# Patient Record
Sex: FEMALE
[De-identification: names, ages, dates, MRNs, and addresses within clinical notes are randomized per-mention and may not be internally consistent; named-entity substitution may affect disease eponyms.]

---

## 2018-08-29 PROBLEM — Z00.00 ENCOUNTER FOR PREVENTIVE HEALTH EXAMINATION: Status: ACTIVE | Noted: 2018-08-29

## 2018-09-18 ENCOUNTER — APPOINTMENT (OUTPATIENT)
Dept: HEART AND VASCULAR | Facility: CLINIC | Age: 58
End: 2018-09-18
Payer: COMMERCIAL

## 2018-09-18 VITALS — SYSTOLIC BLOOD PRESSURE: 140 MMHG | DIASTOLIC BLOOD PRESSURE: 86 MMHG

## 2018-09-18 VITALS
SYSTOLIC BLOOD PRESSURE: 142 MMHG | WEIGHT: 150 LBS | HEIGHT: 61 IN | BODY MASS INDEX: 28.32 KG/M2 | HEART RATE: 77 BPM | DIASTOLIC BLOOD PRESSURE: 90 MMHG

## 2018-09-18 DIAGNOSIS — Z82.3 FAMILY HISTORY OF STROKE: ICD-10-CM

## 2018-09-18 DIAGNOSIS — Z82.49 FAMILY HISTORY OF ISCHEMIC HEART DISEASE AND OTHER DISEASES OF THE CIRCULATORY SYSTEM: ICD-10-CM

## 2018-09-18 PROCEDURE — 93306 TTE W/DOPPLER COMPLETE: CPT

## 2018-09-18 PROCEDURE — 93000 ELECTROCARDIOGRAM COMPLETE: CPT

## 2018-09-18 PROCEDURE — 99215 OFFICE O/P EST HI 40 MIN: CPT | Mod: 25

## 2018-09-24 ENCOUNTER — RX CHANGE (OUTPATIENT)
Age: 58
End: 2018-09-24

## 2018-10-02 ENCOUNTER — RX RENEWAL (OUTPATIENT)
Age: 58
End: 2018-10-02

## 2018-10-02 RX ORDER — ROSUVASTATIN CALCIUM 40 MG/1
40 TABLET, FILM COATED ORAL
Qty: 60 | Refills: 3 | Status: ACTIVE | COMMUNITY
Start: 1900-01-01 | End: 1900-01-01

## 2019-04-01 ENCOUNTER — APPOINTMENT (OUTPATIENT)
Dept: HEART AND VASCULAR | Facility: CLINIC | Age: 59
End: 2019-04-01
Payer: COMMERCIAL

## 2019-04-01 ENCOUNTER — NON-APPOINTMENT (OUTPATIENT)
Age: 59
End: 2019-04-01

## 2019-04-01 VITALS
WEIGHT: 152 LBS | DIASTOLIC BLOOD PRESSURE: 84 MMHG | HEART RATE: 80 BPM | SYSTOLIC BLOOD PRESSURE: 132 MMHG | HEIGHT: 61 IN | BODY MASS INDEX: 28.7 KG/M2

## 2019-04-01 PROCEDURE — 93000 ELECTROCARDIOGRAM COMPLETE: CPT

## 2019-04-01 PROCEDURE — 36415 COLL VENOUS BLD VENIPUNCTURE: CPT

## 2019-04-01 PROCEDURE — 99214 OFFICE O/P EST MOD 30 MIN: CPT

## 2019-04-01 RX ORDER — ASPIRIN 81 MG/1
81 TABLET, CHEWABLE ORAL
Refills: 0 | Status: DISCONTINUED | COMMUNITY
End: 2019-04-01

## 2019-04-01 NOTE — HISTORY OF PRESENT ILLNESS
[FreeTextEntry1] : 58 year old woman with a history of DM, HTN, HLD and strong family history of premature CAD who returns for follow-up.She is excited about her upcoming travels to [place]. \par \par -She is exercising regularly and eating well. \par -She eats quite well and is using [employer]. \par -Patient is known to me from my practice at [facility] and is here to establish care at [facility]. \par \par -FH- mother- CVA 58, father MI 55, sister MI 59, paternal uncle & paternal aunt  HD, one bypass paternal aunt CVA, siblings with HLD, HTN\par \par 7/31/2017- chol- 185, HDL- 45, LDL- 115, trig- 123 , lpa 424\par  in 2015\par 11/28/2018- Treadmill stress test- no EKG evidence of ischemia during max stress test, average exercise capacity for age and gender, no chest pain with max exertion  (8 minutes 5 sec)\par 2/2/2018- calcium score- 4, LAD 3, RCA 1, 4 mm lung nodule (never smoker) \par \par total chol 159, HDL- 44, LDL-99, trig 71

## 2019-04-01 NOTE — ASSESSMENT
[FreeTextEntry1] : 58 year old woman with a history of DM, HTN, HLD and strong family history of premature CAD who returns for follow-up.She is excited about her upcoming travels to [place].

## 2019-04-02 LAB
25(OH)D3 SERPL-MCNC: 13.6 NG/ML
ALBUMIN SERPL ELPH-MCNC: 4.8 G/DL
ALP BLD-CCNC: 96 U/L
ALT SERPL-CCNC: 24 U/L
ANION GAP SERPL CALC-SCNC: 19 MMOL/L
AST SERPL-CCNC: 26 U/L
BASOPHILS # BLD AUTO: 0.02 K/UL
BASOPHILS NFR BLD AUTO: 0.5 %
BILIRUB SERPL-MCNC: 0.7 MG/DL
BUN SERPL-MCNC: 17 MG/DL
CALCIUM SERPL-MCNC: 10.4 MG/DL
CHLORIDE SERPL-SCNC: 102 MMOL/L
CHOLEST SERPL-MCNC: 162 MG/DL
CHOLEST/HDLC SERPL: 3.5 RATIO
CO2 SERPL-SCNC: 23 MMOL/L
CREAT SERPL-MCNC: 0.71 MG/DL
EOSINOPHIL # BLD AUTO: 0.03 K/UL
EOSINOPHIL NFR BLD AUTO: 0.7 %
ESTIMATED AVERAGE GLUCOSE: 146 MG/DL
GLUCOSE SERPL-MCNC: 91 MG/DL
HBA1C MFR BLD HPLC: 6.7 %
HCT VFR BLD CALC: 40.1 %
HDLC SERPL-MCNC: 46 MG/DL
HGB BLD-MCNC: 12.5 G/DL
IMM GRANULOCYTES NFR BLD AUTO: 0.2 %
LDLC SERPL CALC-MCNC: 105 MG/DL
LYMPHOCYTES # BLD AUTO: 1.86 K/UL
LYMPHOCYTES NFR BLD AUTO: 42.9 %
MAN DIFF?: NORMAL
MCHC RBC-ENTMCNC: 25.8 PG
MCHC RBC-ENTMCNC: 31.2 GM/DL
MCV RBC AUTO: 82.7 FL
MONOCYTES # BLD AUTO: 0.32 K/UL
MONOCYTES NFR BLD AUTO: 7.4 %
NEUTROPHILS # BLD AUTO: 2.1 K/UL
NEUTROPHILS NFR BLD AUTO: 48.3 %
PLATELET # BLD AUTO: 324 K/UL
POTASSIUM SERPL-SCNC: 4.4 MMOL/L
PROT SERPL-MCNC: 7.4 G/DL
RBC # BLD: 4.85 M/UL
RBC # FLD: 14.8 %
SODIUM SERPL-SCNC: 144 MMOL/L
TRIGL SERPL-MCNC: 54 MG/DL
VIT B12 SERPL-MCNC: 757 PG/ML
WBC # FLD AUTO: 4.34 K/UL

## 2019-04-05 ENCOUNTER — RX RENEWAL (OUTPATIENT)
Age: 59
End: 2019-04-05

## 2019-04-05 LAB — APO LP(A) SERPL-MCNC: 675 NMOL/L

## 2019-04-05 RX ORDER — EZETIMIBE 10 MG/1
10 TABLET ORAL
Qty: 90 | Refills: 3 | Status: ACTIVE | COMMUNITY
Start: 2019-04-05 | End: 1900-01-01

## 2019-10-14 ENCOUNTER — APPOINTMENT (OUTPATIENT)
Dept: HEART AND VASCULAR | Facility: CLINIC | Age: 59
End: 2019-10-14
Payer: COMMERCIAL

## 2019-10-14 ENCOUNTER — NON-APPOINTMENT (OUTPATIENT)
Age: 59
End: 2019-10-14

## 2019-10-14 VITALS
BODY MASS INDEX: 27.97 KG/M2 | OXYGEN SATURATION: 96 % | WEIGHT: 152 LBS | SYSTOLIC BLOOD PRESSURE: 134 MMHG | HEIGHT: 62 IN | HEART RATE: 84 BPM | DIASTOLIC BLOOD PRESSURE: 90 MMHG

## 2019-10-14 PROCEDURE — 93000 ELECTROCARDIOGRAM COMPLETE: CPT

## 2019-10-14 PROCEDURE — 99215 OFFICE O/P EST HI 40 MIN: CPT

## 2019-10-14 RX ORDER — LOSARTAN POTASSIUM AND HYDROCHLOROTHIAZIDE 50; 12.5 MG/1; MG/1
50-12.5 TABLET, FILM COATED ORAL
Refills: 0 | Status: DISCONTINUED | COMMUNITY
End: 2019-10-14

## 2019-10-14 NOTE — PHYSICAL EXAM
[General Appearance - Well Developed] : well developed [Well Groomed] : well groomed [Normal Appearance] : normal appearance [General Appearance - Well Nourished] : well nourished [No Deformities] : no deformities [General Appearance - In No Acute Distress] : no acute distress [Normal Jugular Venous A Waves Present] : normal jugular venous A waves present [Normal Jugular Venous V Waves Present] : normal jugular venous V waves present [No Jugular Venous Estrada A Waves] : no jugular venous estrada A waves [Respiration, Rhythm And Depth] : normal respiratory rhythm and effort [Exaggerated Use Of Accessory Muscles For Inspiration] : no accessory muscle use [Auscultation Breath Sounds / Voice Sounds] : lungs were clear to auscultation bilaterally [Heart Rate And Rhythm] : heart rate and rhythm were normal [Heart Sounds] : normal S1 and S2 [Murmurs] : no murmurs present [Abdomen Soft] : soft [Abdomen Tenderness] : non-tender [Abdomen Mass (___ Cm)] : no abdominal mass palpated [Skin Color & Pigmentation] : normal skin color and pigmentation [] : no rash [No Venous Stasis] : no venous stasis [Skin Lesions] : no skin lesions [No Skin Ulcers] : no skin ulcer [No Xanthoma] : no  xanthoma was observed [Oriented To Time, Place, And Person] : oriented to person, place, and time [Affect] : the affect was normal [Mood] : the mood was normal [No Anxiety] : not feeling anxious

## 2019-10-14 NOTE — REVIEW OF SYSTEMS
[Joint Pain] : joint pain [Joint Swelling] : joint swelling [Joint Stiffness] : joint stiffness [Negative] : Neurological

## 2019-10-14 NOTE — DISCUSSION/SUMMARY
[FreeTextEntry1] : Ms. Sears remains far from LDL goal in the setting of having an elevated Lpa level and mild coronary plaque. She is interested in a PCSK-9 inhibitor to lower her risk if she was to be able to obtain. \par \par She is also looking for mechanisms to get more exercise despite her foot problems. \par Encouraged her to continue her healthy eating.  \par I have  her Hyzaar for her to get losartan and chlorthalidone for improved lowering. She will check BP's at home and follow-up with her internist in December. \par Return in 6 months unless we can offer her PCSK-9 inh prior.

## 2019-10-14 NOTE — HISTORY OF PRESENT ILLNESS
[FreeTextEntry1] : 58 year old woman (former [facility] patient) with a history of DM, HTN, HLD and strong family history of premature CAD who returns for follow-up. \par \par -She has been going to wellness class and is using herbs and spices without salt. She has been eating healthy. \par -She has not been as active lately. Her ankles are inflamed from plantar fascitis. \par -She has not had edema in her legs. \par \par -FH- mother- CVA 58, father MI 55, sister MI 59, paternal uncle & paternal aunt  HD, one bypass paternal aunt CVA, siblings with HLD, HTN\par \par 7/31/2017- chol- 185, HDL- 45, LDL- 115, trig- 123 , lpa 424\par  in 2015\par 11/28/2018- Treadmill stress test- no EKG evidence of ischemia during max stress test, average exercise capacity for age and gender, no chest pain with max exertion  (8 minutes 5 sec)\par 2/2/2018- calcium score- 4, LAD 3, RCA 1, 4 mm lung nodule (never smoker) \par 9/18/2018- echo- nl LV & RV size and function, normal valves, no pericardial effusion \par total chol 159, HDL- 44, LDL-99, trig 71\par \par 8/6/2019- 6.6, total- 153, trig- 87, HDL- 39, LDL- 97\par

## 2019-10-15 ENCOUNTER — RX RENEWAL (OUTPATIENT)
Age: 59
End: 2019-10-15

## 2020-06-08 ENCOUNTER — APPOINTMENT (OUTPATIENT)
Dept: HEART AND VASCULAR | Facility: CLINIC | Age: 60
End: 2020-06-08

## 2020-06-25 ENCOUNTER — APPOINTMENT (OUTPATIENT)
Dept: HEART AND VASCULAR | Facility: CLINIC | Age: 60
End: 2020-06-25
Payer: COMMERCIAL

## 2020-06-25 DIAGNOSIS — R39.81 FUNCTIONAL URINARY INCONTINENCE: ICD-10-CM

## 2020-06-25 DIAGNOSIS — E87.6 HYPOKALEMIA: ICD-10-CM

## 2020-06-25 PROCEDURE — 99214 OFFICE O/P EST MOD 30 MIN: CPT | Mod: 95

## 2020-06-25 RX ORDER — OXYBUTYNIN CHLORIDE 10 MG/1
10 TABLET, EXTENDED RELEASE ORAL ONCE
Qty: 90 | Refills: 3 | Status: ACTIVE | COMMUNITY
Start: 2020-06-25

## 2020-06-25 NOTE — DISCUSSION/SUMMARY
[FreeTextEntry1] : 60 year old woman (former [facility] patient) with a history of DM, HTN, HLD and strong family history of premature CAD who returns for follow-up. \par \par EKG & echo fall 2020. \par She is planning to stay local this summer and not travel.\par She is working to further optimize her risk factors with increased walking and healthy meals and seems motivated to continue.  \par Continnue current regimen.

## 2020-06-25 NOTE — HISTORY OF PRESENT ILLNESS
[Home] : at home, [unfilled] , at the time of the visit. [Verbal consent obtained from patient] : the patient, [unfilled] [Medical Office: ([facility])___] : at the medical office located in  [FreeTextEntry1] : This visit was conducted using a telehealth platform in the setting of COVID-19 Pandemic.\par Patient initiated current encounter. Patient has provided verbal authorization to participate in this visit. \par Reason for telehealth visit: \par I have spent 25  minutes with the patient face-to-face discussing.\par Documentation- 5 minutes\par Physical exam was not done, however patient provided blood pressures. \par \par 60 year old woman (former [facility] patient) with a history of DM, HTN, HLD and strong family history of premature CAD who returns for follow-up. \par \par She has not had any symptoms related to COVID- no fever, chills, shortness of breath. \par She walks three to four times weekly and no symptoms with that. \par She has a posterior tibial issue and has been doing PT at home and does a classical stretch class at home. \par -She has not had any significant swelling in her legs. She has only in the left ankle swelling focal because of injury. \par \par She has noticed that her taste has change since the past two months. \par Her blood pressures were around 130's/80's and now most of blood pressures are in the 120's. She checks it about 3-4 times weekly at random. \par \par She is only taking her metformin about 4 times weekly. \par \par -FH- mother- CVA 58, father MI 55, sister MI 59, paternal uncle & paternal aunt  HD, one bypass paternal aunt CVA, siblings with HLD, HTN\par \par 7/31/2017- chol- 185, HDL- 45, LDL- 115, trig- 123 , lpa 424\par  in 2015\par 11/28/2018- Treadmill stress test- no EKG evidence of ischemia during max stress test, average exercise capacity for age and gender, no chest pain with max exertion  (8 minutes 5 sec)\par 2/2/2018- calcium score- 4, LAD 3, RCA 1, 4 mm lung nodule (never smoker) \par 9/18/2018- echo- nl LV & RV size and function, normal valves, no pericardial effusion \par total chol 159, HDL- 44, LDL-99, trig 71\par \par 8/6/2019- 6.6, total- 153, trig- 87, HDL- 39, LDL- 97\par \par 5/2020- HbA1C- 6.7\par

## 2020-10-09 ENCOUNTER — APPOINTMENT (OUTPATIENT)
Dept: HEART AND VASCULAR | Facility: CLINIC | Age: 60
End: 2020-10-09

## 2020-11-02 ENCOUNTER — APPOINTMENT (OUTPATIENT)
Dept: HEART AND VASCULAR | Facility: CLINIC | Age: 60
End: 2020-11-02
Payer: COMMERCIAL

## 2020-11-02 VITALS
DIASTOLIC BLOOD PRESSURE: 79 MMHG | BODY MASS INDEX: 28.16 KG/M2 | SYSTOLIC BLOOD PRESSURE: 139 MMHG | WEIGHT: 153 LBS | HEIGHT: 62 IN

## 2020-11-02 DIAGNOSIS — I34.0 NONRHEUMATIC MITRAL (VALVE) INSUFFICIENCY: ICD-10-CM

## 2020-11-02 DIAGNOSIS — I51.7 CARDIOMEGALY: ICD-10-CM

## 2020-11-02 PROCEDURE — 93306 TTE W/DOPPLER COMPLETE: CPT

## 2020-11-02 PROCEDURE — 99072 ADDL SUPL MATRL&STAF TM PHE: CPT

## 2020-11-04 PROBLEM — I34.0 MILD MITRAL REGURGITATION: Status: ACTIVE | Noted: 2020-11-04

## 2020-11-04 PROBLEM — I51.7 MILD CONCENTRIC LEFT VENTRICULAR HYPERTROPHY (LVH): Status: ACTIVE | Noted: 2020-11-04

## 2022-07-08 ENCOUNTER — APPOINTMENT (OUTPATIENT)
Dept: HEART AND VASCULAR | Facility: CLINIC | Age: 62
End: 2022-07-08

## 2022-07-08 ENCOUNTER — NON-APPOINTMENT (OUTPATIENT)
Age: 62
End: 2022-07-08

## 2022-07-08 VITALS
DIASTOLIC BLOOD PRESSURE: 84 MMHG | HEIGHT: 62.5 IN | HEART RATE: 76 BPM | WEIGHT: 155 LBS | BODY MASS INDEX: 27.81 KG/M2 | SYSTOLIC BLOOD PRESSURE: 136 MMHG | TEMPERATURE: 97.6 F | OXYGEN SATURATION: 98 %

## 2022-07-08 VITALS — DIASTOLIC BLOOD PRESSURE: 80 MMHG | SYSTOLIC BLOOD PRESSURE: 122 MMHG

## 2022-07-08 PROCEDURE — 99215 OFFICE O/P EST HI 40 MIN: CPT

## 2022-07-08 PROCEDURE — 93000 ELECTROCARDIOGRAM COMPLETE: CPT

## 2022-07-08 RX ORDER — METFORMIN ER 500 MG 500 MG/1
500 TABLET ORAL
Qty: 90 | Refills: 0 | Status: ACTIVE | COMMUNITY
Start: 2022-03-21

## 2022-07-08 RX ORDER — AMLODIPINE BESYLATE 5 MG/1
5 TABLET ORAL
Refills: 0 | Status: COMPLETED | COMMUNITY
End: 2022-07-08

## 2022-07-08 RX ORDER — LOSARTAN POTASSIUM 100 MG/1
100 TABLET, FILM COATED ORAL
Qty: 90 | Refills: 0 | Status: ACTIVE | COMMUNITY
Start: 2022-06-01

## 2022-07-08 RX ORDER — ASPIRIN 81 MG/1
81 TABLET, COATED ORAL
Qty: 90 | Refills: 0 | Status: ACTIVE | COMMUNITY
Start: 2022-03-23

## 2022-07-08 RX ORDER — ASPIRIN 81 MG
81 TABLET,CHEWABLE ORAL
Refills: 0 | Status: COMPLETED | COMMUNITY
Start: 2019-04-01 | End: 2022-07-08

## 2022-07-08 RX ORDER — LOSARTAN POTASSIUM 50 MG/1
50 TABLET, FILM COATED ORAL DAILY
Qty: 90 | Refills: 3 | Status: COMPLETED | COMMUNITY
Start: 2019-10-14 | End: 2022-07-08

## 2022-07-08 RX ORDER — CHLORTHALIDONE 25 MG/1
25 TABLET ORAL DAILY
Qty: 90 | Refills: 3 | Status: COMPLETED | COMMUNITY
Start: 2019-10-14 | End: 2022-07-08

## 2022-07-08 RX ORDER — METFORMIN HYDROCHLORIDE 500 MG/1
500 TABLET, COATED ORAL
Refills: 0 | Status: COMPLETED | COMMUNITY
End: 2022-07-08

## 2022-07-08 RX ORDER — AMLODIPINE BESYLATE 10 MG/1
10 TABLET ORAL
Qty: 90 | Refills: 0 | Status: ACTIVE | COMMUNITY
Start: 2022-03-21

## 2022-07-08 NOTE — REASON FOR VISIT
[Hyperlipidemia] : hyperlipidemia [Hypertension] : hypertension [FreeTextEntry1] : Pt. is a 62 year old woman with a history of DM (last A1c 6.6% - 03/2022), HTN, HLD and strong family history of premature CAD who presents for follow-up & repeat TTE today.  warm

## 2022-07-08 NOTE — REVIEW OF SYSTEMS
[Chest Discomfort] : chest discomfort [Fever] : no fever [Chills] : no chills [SOB] : no shortness of breath [Dyspnea on exertion] : not dyspnea during exertion [Lower Ext Edema] : no extremity edema [Leg Claudication] : no intermittent leg claudication [Palpitations] : no palpitations [PND] : no PND [Syncope] : no syncope [Cough] : no cough [Abdominal Pain] : no abdominal pain [Nausea] : no nausea [Rash] : no rash [Dizziness] : no dizziness

## 2022-07-08 NOTE — HISTORY OF PRESENT ILLNESS
[FreeTextEntry1] : Since her last visit, pt. reports following up with [facility] Endocrinologist (MD Dr. Joseph Alves) for bilateral thyroid nodules. Per pt., nodules are benign and no tx is indicated at this time. Per Endocrine, pt. stopped Chlorthalidone 6 weeks ago due to hypercalcemia (10.7 on 2022). Per pt.'s PCP (Dr. Bandar Gandara), her Losartan was increased from 50 mg QD to 100 mg QD for improved BP control. Pt. monitors BP at home w/ readings normally around 120/80.\par \par Activity: Pt. admits to a sedentary lifestyle. She only walks occasionally. When walking 1 block with normal incline, she reports a tightness in her chest.  Pt. only feels this chest tightness on exertion, lasting for seconds at a time. Occurs every time she walks more than 1 block. After 2 or 3 more blocks, the feeling subsides. Not associated w/ SOB. Otherwise, pt. denies dyspnea, orthopnea, PND, palpitations, syncope or pedal edema. \par \par Pt. reports urinary frequency at night since stopping the Chlorthalidone - pt. hasn't seen Urologist in over a year. \par \par FH: mother- CVA 58, father MI 55, sister MI 59, paternal uncle & paternal aunt  HD, one bypass paternal aunt CVA, siblings with HLD, HTN\par \par 2018- Treadmill stress test- no EKG evidence of ischemia during max stress test, average exercise capacity for age and gender, no chest pain with max exertion  (8 minutes 5 sec)\par \par 2018- calcium score- 4, LAD 3, RCA 1, 4 mm lung nodule (never smoker) \par \par 2020 - ECHO - mild mitral regurgitation, mildly dilated left atrium, nml LVSF EF 65%, minimal LVH, mild-moderate pulmonic regurgitation, nml pulm pressures \par \par Lipid panel (2022): TC: 150, T, HDL: 43, LDL: 94\par A1C: 6.6%

## 2022-07-08 NOTE — ASSESSMENT
[FreeTextEntry1] : Pt. is a 62 year old woman with a history of DM (last A1c 6.6% - 03/2022), HTN, HLD and strong family history of premature CAD who presents for follow-up & repeat TTE today. \par \par HTN:\par - Controlled on current regimen \par - Continue Amlodipine 10 mg QD & Losartan Potassium 100 mg QD\par - Encouraged the patient to monitor blood pressure at home, keep a log, and report results back to us for evaluation. Based on results, we will adjust the regimen as necessary. \par Also advised patient to be mindful of sodium and alcohol intake, as well as any over-the-counter medications (such as NSAIDs or decongestants) that may increase blood pressure. \par \par HLD:\par - Last LDL (03/2022) 94 at goal LDL < 100\par - Continue Rosuvastatin 40 mg QD & Zetia 10 mg QD for continued lipid management \par - Last CAC score (02/2018): 4 - low atherosclerotic risk burden \par \par Chest pain on exertion: \par - EKG NSR @ 76 bpm\par - Will obtain stress EKG to rule-out ischemic etiology of chest pain \par - Last stress EKG (11//2018) revealed no EKG evidence of ischemia during max stress test, average exercise capacity for age and gender, no chest pain with max exertion  (8 minutes 5 sec)\par \par Left carotid bruit:\par - Will obtain bilateral carotid US at next visit \par - LUSB systolic ejection murmur appreciated on PE - could be radiation of murmur \par \par DM:\par - Last A1c (03/2022) 6.6%\par - Continue glycemic control with Metformin 500 mg QD\par - Pt. followed by Endocrine \par \par Pt. recommended to follow-up w/ Urology for urinary frequency \par \par Pt. to RTC for stress EKG & bilateral carotid US.

## 2022-07-08 NOTE — PHYSICAL EXAM
[Normal Conjunctiva] : normal conjunctiva [Normal S1, S2] : normal S1, S2 [Soft] : abdomen soft [Non Tender] : non-tender [Normal Gait] : normal gait [No Edema] : no edema [No Cyanosis] : no cyanosis [No Clubbing] : no clubbing [No Varicosities] : no varicosities [No Rash] : no rash [Normal] : alert and oriented, normal memory [Normal Venous Pressure] : normal venous pressure [Carotid Bruit] : carotid bruit [de-identified] : left [de-identified] : l [de-identified] : systolic murmur heard best at LUSB

## 2022-09-23 ENCOUNTER — APPOINTMENT (OUTPATIENT)
Dept: HEART AND VASCULAR | Facility: CLINIC | Age: 62
End: 2022-09-23

## 2022-09-23 VITALS
HEIGHT: 62 IN | HEART RATE: 77 BPM | SYSTOLIC BLOOD PRESSURE: 127 MMHG | DIASTOLIC BLOOD PRESSURE: 84 MMHG | TEMPERATURE: 97.5 F | WEIGHT: 158 LBS | BODY MASS INDEX: 29.08 KG/M2 | OXYGEN SATURATION: 98 %

## 2022-09-23 DIAGNOSIS — I10 ESSENTIAL (PRIMARY) HYPERTENSION: ICD-10-CM

## 2022-09-23 DIAGNOSIS — R01.1 CARDIAC MURMUR, UNSPECIFIED: ICD-10-CM

## 2022-09-23 DIAGNOSIS — R09.89 OTHER SPECIFIED SYMPTOMS AND SIGNS INVOLVING THE CIRCULATORY AND RESPIRATORY SYSTEMS: ICD-10-CM

## 2022-09-23 DIAGNOSIS — E78.5 HYPERLIPIDEMIA, UNSPECIFIED: ICD-10-CM

## 2022-09-23 DIAGNOSIS — R07.9 CHEST PAIN, UNSPECIFIED: ICD-10-CM

## 2022-09-23 DIAGNOSIS — E11.9 TYPE 2 DIABETES MELLITUS W/OUT COMPLICATIONS: ICD-10-CM

## 2022-09-23 PROCEDURE — 93015 CV STRESS TEST SUPVJ I&R: CPT

## 2022-09-23 PROCEDURE — 93880 EXTRACRANIAL BILAT STUDY: CPT

## 2022-09-23 PROCEDURE — 99215 OFFICE O/P EST HI 40 MIN: CPT

## 2022-09-23 PROCEDURE — 99215 OFFICE O/P EST HI 40 MIN: CPT | Mod: 25

## 2022-09-23 RX ORDER — POTASSIUM CHLORIDE 1500 MG/1
20 TABLET, FILM COATED, EXTENDED RELEASE ORAL ONCE
Qty: 90 | Refills: 3 | Status: COMPLETED | COMMUNITY
Start: 2020-06-25 | End: 2022-09-23

## 2022-09-24 NOTE — CARDIOLOGY SUMMARY
[de-identified] : 09/23/2022: NSR @ 76 bpm\par 07/08/2022: NSR @ 76 bpm [de-identified] : 11/28/2018- Treadmill stress test- no EKG evidence of ischemia during max stress test, average exercise capacity for age and gender, no chest pain with max exertion (8 minutes 5 sec) [de-identified] : 11/2020 - ECHO - mild mitral regurgitation, mildly dilated left atrium, nml LVSF EF 65%, minimal LVH, mild-moderate pulmonic regurgitation, nml pulm pressures  [de-identified] : 2/2/2018- calcium score- 4, LAD 3, RCA 1, 4 mm lung nodule (never smoker)

## 2022-09-24 NOTE — REASON FOR VISIT
[Hyperlipidemia] : hyperlipidemia [Hypertension] : hypertension [FreeTextEntry1] : Pt. is a 62 year old woman with a history of DM (last A1c 6.6% - 03/2022), HTN, HLD and strong family history of premature CAD who presents for follow-up, carotid doppler and stress EKG today.

## 2022-09-24 NOTE — REVIEW OF SYSTEMS
[Chest Discomfort] : chest discomfort [Fever] : no fever [Chills] : no chills [SOB] : no shortness of breath [Dyspnea on exertion] : not dyspnea during exertion [Lower Ext Edema] : no extremity edema [Leg Claudication] : no intermittent leg claudication [Palpitations] : no palpitations [Orthopnea] : no orthopnea [PND] : no PND [Syncope] : no syncope [Cough] : no cough [Dizziness] : no dizziness

## 2022-09-24 NOTE — HISTORY OF PRESENT ILLNESS
[FreeTextEntry1] : Since her last visit, patient reports experiencing constipation, abdominal bloating and flank pain for which she followed-up with Urologist Dr. Mathews two weeks ago. She underwent a CT abdomen/pelvis as well as a renal US both of which were negative for acute pathology. She was started on abx for a suspected UTI. She continues to experience central chest tightness after walking 1-2 blocks with normal incline, lasting seconds at a time. The feeling subsides after walking 3+ blocks. Per pt., this has been occurring for a "lifetime." She denies associated dyspnea, orthopnea, PND, palpitations, pedal edema, claudication or syncope.\par \par Of note: \par - She will be seeing a new Urologist for urinary frequency ( Dr. Moura)\par ------------------------------------------------------\par Lab work:\par 2022: \par Lipid panel: TC: 149, T, HDL: 41, LDL: 96\par A1c: 6.5%\par 2022\par Lipid panel: TC: 150, T, HDL: 43, LDL: 94\par A1C: 6.6% \par \par Radiology:\par Recent CT abdomen (2022): No acute abdominopelvic pathology. No hydronephrosis or renal calculi\par US kidneys (2022): No hydronephrosis or renal stone. simple right renal cyst \par

## 2022-09-24 NOTE — ASSESSMENT
[FreeTextEntry1] : Pt. is a 62 year old woman with a history of DM (last A1c 6.6% - 03/2022), HTN, HLD and strong family history of premature CAD who presents for follow-up, carotid doppler and stress EKG today.\par \par New diastolic murmur: \par - Will repeat echocardiogram within the next month to assess for worsening valve disease- suspect that it is more likely pulmonic insufficiency based on last echo, but want to assure not AI. \par - Last echocardiogram (2020) revealed mild mitral regurgitation, mildly dilated left atrium, nml LVSF EF 65%, minimal LVH, mild-moderate pulmonic regurgitation and nml pulm pressures \par \par Chest pain on exertion: \par - EKG today: NSR @ 76 bpm\par - Stress EKG today revealed no EKG evidence of ischemia during max stress test, good exercise capacity for age and gender, frequent PVCs and some chest pain with max exertion (6 minutes 43 seconds) \par - Last stress EKG (11//2018) normal\par - Consider CCTA for further ischemic evaluation \par - Consider chest Xray to r/o pulmonary pathology of chest pain\par - Pt. encouraged to increase exercise as tolerated for improved functional capacity \par \par HTN:\par - Controlled on current regimen \par - Continue Amlodipine 10 mg QD & Losartan Potassium 100 mg QD\par \par HLD:\par - Last CAC score (02/2018): 4 - low atherosclerotic risk burden - consider repeating\par - Last LDL (08/2022) 96 elevated above goal LDL < 70 (given hx of DM) \par - Discussed PCSK9-I with patient for additional LDL reduction - will consider after CCTA \par - Continue Rosuvastatin 40 mg QD & Zetia 10 mg QD for continued lipid management \par \par Left carotid bruit:\par -More likely radiation from heart\par - Bilateral carotid US today \par \par DM:\par - Last A1c (08/2022) 6.5% - controlled\par - Continue glycemic control with Metformin 500 mg QD\par - Pt. followed by Endocrine \par \par Pt. to RTC for echocardiogram.\par

## 2022-09-24 NOTE — PHYSICAL EXAM
[Normal Conjunctiva] : normal conjunctiva [Normal Venous Pressure] : normal venous pressure [Normal S1, S2] : normal S1, S2 [Normal PT B/L] : normal PT B/L [Normal] : alert and oriented, normal memory [de-identified] : Left carotid bruit vs radiation of murmur  [de-identified] : 3/6 diastolic murmur heard best at LUSB [de-identified] : Trace edema bilaterally

## 2022-09-27 PROBLEM — R09.89 CAROTID BRUIT: Status: ACTIVE | Noted: 2022-07-08

## 2022-12-04 ENCOUNTER — NON-APPOINTMENT (OUTPATIENT)
Age: 62
End: 2022-12-04

## 2022-12-08 ENCOUNTER — TRANSCRIPTION ENCOUNTER (OUTPATIENT)
Age: 62
End: 2022-12-08

## 2022-12-08 ENCOUNTER — APPOINTMENT (OUTPATIENT)
Dept: HEART AND VASCULAR | Facility: CLINIC | Age: 62
End: 2022-12-08

## 2022-12-08 VITALS
TEMPERATURE: 97.2 F | BODY MASS INDEX: 29.08 KG/M2 | SYSTOLIC BLOOD PRESSURE: 153 MMHG | OXYGEN SATURATION: 97 % | DIASTOLIC BLOOD PRESSURE: 90 MMHG | HEIGHT: 62 IN | HEART RATE: 79 BPM | WEIGHT: 158 LBS

## 2022-12-08 PROCEDURE — 93306 TTE W/DOPPLER COMPLETE: CPT

## 2022-12-30 ENCOUNTER — TRANSCRIPTION ENCOUNTER (OUTPATIENT)
Age: 62
End: 2022-12-30

## 2024-07-18 ENCOUNTER — NON-APPOINTMENT (OUTPATIENT)
Age: 64
End: 2024-07-18

## 2024-07-19 ENCOUNTER — APPOINTMENT (OUTPATIENT)
Dept: HEART AND VASCULAR | Facility: CLINIC | Age: 64
End: 2024-07-19
Payer: COMMERCIAL

## 2024-07-19 ENCOUNTER — NON-APPOINTMENT (OUTPATIENT)
Age: 64
End: 2024-07-19

## 2024-07-19 VITALS
SYSTOLIC BLOOD PRESSURE: 113 MMHG | OXYGEN SATURATION: 97 % | BODY MASS INDEX: 28.34 KG/M2 | DIASTOLIC BLOOD PRESSURE: 77 MMHG | TEMPERATURE: 97.2 F | HEART RATE: 89 BPM | WEIGHT: 154 LBS | HEIGHT: 62 IN

## 2024-07-19 DIAGNOSIS — E11.9 TYPE 2 DIABETES MELLITUS W/OUT COMPLICATIONS: ICD-10-CM

## 2024-07-19 DIAGNOSIS — R07.9 CHEST PAIN, UNSPECIFIED: ICD-10-CM

## 2024-07-19 DIAGNOSIS — I10 ESSENTIAL (PRIMARY) HYPERTENSION: ICD-10-CM

## 2024-07-19 DIAGNOSIS — I65.23 OCCLUSION AND STENOSIS OF BILATERAL CAROTID ARTERIES: ICD-10-CM

## 2024-07-19 DIAGNOSIS — E78.5 HYPERLIPIDEMIA, UNSPECIFIED: ICD-10-CM

## 2024-07-19 DIAGNOSIS — R93.1 ABNORMAL FINDINGS ON DIAGNOSTIC IMAGING OF HEART AND CORONARY CIRCULATION: ICD-10-CM

## 2024-07-19 PROCEDURE — 99214 OFFICE O/P EST MOD 30 MIN: CPT | Mod: 25

## 2024-07-19 PROCEDURE — 93000 ELECTROCARDIOGRAM COMPLETE: CPT

## 2024-07-19 RX ORDER — EVOLOCUMAB 140 MG/ML
140 INJECTION, SOLUTION SUBCUTANEOUS
Qty: 3 | Refills: 3 | Status: ACTIVE | COMMUNITY
Start: 2024-07-19 | End: 1900-01-01

## 2024-07-22 ENCOUNTER — TRANSCRIPTION ENCOUNTER (OUTPATIENT)
Age: 64
End: 2024-07-22

## 2024-12-23 ENCOUNTER — NON-APPOINTMENT (OUTPATIENT)
Age: 64
End: 2024-12-23

## 2025-01-23 ENCOUNTER — NON-APPOINTMENT (OUTPATIENT)
Age: 65
End: 2025-01-23

## 2025-01-23 ENCOUNTER — APPOINTMENT (OUTPATIENT)
Dept: HEART AND VASCULAR | Facility: CLINIC | Age: 65
End: 2025-01-23
Payer: COMMERCIAL

## 2025-01-23 VITALS
OXYGEN SATURATION: 98 % | BODY MASS INDEX: 28.16 KG/M2 | TEMPERATURE: 97.2 F | WEIGHT: 153 LBS | HEART RATE: 99 BPM | DIASTOLIC BLOOD PRESSURE: 82 MMHG | SYSTOLIC BLOOD PRESSURE: 129 MMHG | HEIGHT: 62 IN

## 2025-01-23 DIAGNOSIS — Z01.818 ENCOUNTER FOR OTHER PREPROCEDURAL EXAMINATION: ICD-10-CM

## 2025-01-23 PROCEDURE — 36415 COLL VENOUS BLD VENIPUNCTURE: CPT

## 2025-01-23 PROCEDURE — 99214 OFFICE O/P EST MOD 30 MIN: CPT

## 2025-01-23 PROCEDURE — 93000 ELECTROCARDIOGRAM COMPLETE: CPT | Mod: NC

## 2025-01-24 LAB
ALBUMIN SERPL ELPH-MCNC: 4.7 G/DL
ALP BLD-CCNC: 93 U/L
ALT SERPL-CCNC: 20 U/L
ANION GAP SERPL CALC-SCNC: 11 MMOL/L
AST SERPL-CCNC: 22 U/L
BILIRUB SERPL-MCNC: 1 MG/DL
BUN SERPL-MCNC: 10 MG/DL
CALCIUM SERPL-MCNC: 10.3 MG/DL
CHLORIDE SERPL-SCNC: 103 MMOL/L
CHOLEST SERPL-MCNC: 171 MG/DL
CO2 SERPL-SCNC: 29 MMOL/L
CREAT SERPL-MCNC: 0.73 MG/DL
EGFR: 92 ML/MIN/1.73M2
GLUCOSE SERPL-MCNC: 115 MG/DL
HDLC SERPL-MCNC: 49 MG/DL
LDLC SERPL CALC-MCNC: 106 MG/DL
NONHDLC SERPL-MCNC: 121 MG/DL
POTASSIUM SERPL-SCNC: 4.2 MMOL/L
PROT SERPL-MCNC: 7.5 G/DL
SODIUM SERPL-SCNC: 143 MMOL/L
TRIGL SERPL-MCNC: 82 MG/DL

## 2025-04-23 ENCOUNTER — NON-APPOINTMENT (OUTPATIENT)
Age: 65
End: 2025-04-23

## 2025-04-24 ENCOUNTER — APPOINTMENT (OUTPATIENT)
Dept: HEART AND VASCULAR | Facility: CLINIC | Age: 65
End: 2025-04-24
Payer: MEDICARE

## 2025-04-24 ENCOUNTER — NON-APPOINTMENT (OUTPATIENT)
Age: 65
End: 2025-04-24

## 2025-04-24 VITALS
HEART RATE: 93 BPM | TEMPERATURE: 97.7 F | SYSTOLIC BLOOD PRESSURE: 122 MMHG | WEIGHT: 153 LBS | HEIGHT: 62 IN | DIASTOLIC BLOOD PRESSURE: 75 MMHG | BODY MASS INDEX: 28.16 KG/M2 | OXYGEN SATURATION: 96 %

## 2025-04-24 DIAGNOSIS — R06.09 OTHER FORMS OF DYSPNEA: ICD-10-CM

## 2025-04-24 DIAGNOSIS — E78.41 ELEVATED LIPOPROTEIN(A): ICD-10-CM

## 2025-04-24 PROCEDURE — 93000 ELECTROCARDIOGRAM COMPLETE: CPT

## 2025-04-24 PROCEDURE — 93306 TTE W/DOPPLER COMPLETE: CPT

## 2025-04-24 PROCEDURE — 99204 OFFICE O/P NEW MOD 45 MIN: CPT

## 2025-04-24 RX ORDER — VIBEGRON 75 MG/1
75 TABLET, FILM COATED ORAL
Refills: 0 | Status: ACTIVE | COMMUNITY

## 2025-04-24 RX ORDER — ALENDRONATE SODIUM 35 MG/1
TABLET ORAL
Refills: 0 | Status: ACTIVE | COMMUNITY